# Patient Record
Sex: FEMALE | Race: WHITE
[De-identification: names, ages, dates, MRNs, and addresses within clinical notes are randomized per-mention and may not be internally consistent; named-entity substitution may affect disease eponyms.]

---

## 2019-03-24 ENCOUNTER — HOSPITAL ENCOUNTER (OUTPATIENT)
Dept: HOSPITAL 84 - D.ER | Age: 54
Setting detail: OBSERVATION
Discharge: HOME | End: 2019-03-24
Attending: FAMILY MEDICINE | Admitting: FAMILY MEDICINE
Payer: COMMERCIAL

## 2019-03-24 VITALS — DIASTOLIC BLOOD PRESSURE: 78 MMHG | SYSTOLIC BLOOD PRESSURE: 135 MMHG

## 2019-03-24 VITALS — WEIGHT: 147.51 LBS | HEIGHT: 65 IN | BODY MASS INDEX: 24.58 KG/M2 | BODY MASS INDEX: 24.58 KG/M2

## 2019-03-24 VITALS — SYSTOLIC BLOOD PRESSURE: 113 MMHG | DIASTOLIC BLOOD PRESSURE: 71 MMHG

## 2019-03-24 VITALS — SYSTOLIC BLOOD PRESSURE: 135 MMHG | DIASTOLIC BLOOD PRESSURE: 78 MMHG

## 2019-03-24 DIAGNOSIS — Z82.49: ICD-10-CM

## 2019-03-24 DIAGNOSIS — I24.9: Primary | ICD-10-CM

## 2019-03-24 LAB
ANION GAP SERPL CALC-SCNC: 15 MMOL/L (ref 8–16)
BASOPHILS NFR BLD AUTO: 0.1 % (ref 0–2)
BUN SERPL-MCNC: 11 MG/DL (ref 7–18)
CALCIUM SERPL-MCNC: 8.2 MG/DL (ref 8.5–10.1)
CHLORIDE SERPL-SCNC: 104 MMOL/L (ref 98–107)
CK MB SERPL-MCNC: 1.5 U/L (ref 0–3.6)
CK SERPL-CCNC: 86 UL (ref 21–215)
CO2 SERPL-SCNC: 22 MMOL/L (ref 21–32)
CREAT SERPL-MCNC: 0.7 MG/DL (ref 0.6–1.3)
EOSINOPHIL NFR BLD: 0.8 % (ref 0–7)
ERYTHROCYTE [DISTWIDTH] IN BLOOD BY AUTOMATED COUNT: 13 % (ref 11.5–14.5)
GLUCOSE SERPL-MCNC: 119 MG/DL (ref 74–106)
HCT VFR BLD CALC: 40.1 % (ref 36–48)
HGB BLD-MCNC: 13.5 G/DL (ref 12–16)
IMM GRANULOCYTES NFR BLD: 0.1 % (ref 0–5)
LYMPHOCYTES NFR BLD AUTO: 20.9 % (ref 15–50)
MCH RBC QN AUTO: 32.1 PG (ref 26–34)
MCHC RBC AUTO-ENTMCNC: 33.7 G/DL (ref 31–37)
MCV RBC: 95.5 FL (ref 80–100)
MONOCYTES NFR BLD: 8.9 % (ref 2–11)
NEUTROPHILS NFR BLD AUTO: 69.2 % (ref 40–80)
OSMOLALITY SERPL CALC.SUM OF ELEC: 273 MOSM/KG (ref 275–300)
PLATELET # BLD: 247 10X3/UL (ref 130–400)
PMV BLD AUTO: 10.2 FL (ref 7.4–10.4)
POTASSIUM SERPL-SCNC: 4 MMOL/L (ref 3.5–5.1)
RBC # BLD AUTO: 4.2 10X6/UL (ref 4–5.4)
SODIUM SERPL-SCNC: 137 MMOL/L (ref 136–145)
TROPONIN I SERPL-MCNC: 0.08 NG/ML (ref 0–0.06)
WBC # BLD AUTO: 11.8 10X3/UL (ref 4.8–10.8)

## 2019-03-24 NOTE — NUR
1500 20 GAUGE IV CATHETER REMOVED FROM LEFT FOREARM. NO BLEEDING FROM SITE.
2X2 GAUZE APPLIED TO SITE AND COVERED WITH BANDAID.
1515 DISCHARGE INSTRUCTIONS PROVIED TO PATIENT AND HER . VERBALIZED
UNDERSTANDING OF ALL INSTRUCTIONS PROVIDED.
1545 PATIENT LEFT UNIT WITH ALL PERSONAL BELONGINGS. PATIENT IN NO DISTRESS
UPON LEAVING UNIT VIA WHEELCHAIR.  PATIENT DISCHARGED TO HOME WITH  VIA
PERSONAL VEHICLE.

## 2019-03-24 NOTE — NUR
PATIENT RETURNED TO UNIT FROM CATH LAB WITH TR BAND TO RIGHT RADIAL. PULSES
PATENT. NO BLEEDING FROM TR BAND. ALERT/ORIENTED. CALL LIGHT WITHIN REACH. NO
DISTRESS.

## 2019-03-24 NOTE — HEMODYNAMI
PATIENT:STEVE MUNOZ                               MEDICAL RECORD: [id number]
: 61                                            LOCATION:[address]
ACCT# [id number]                                       ADMISSION DATE: 19
 
 
 Generatedon:3/24/402576:07
Patient name: STEVE MUNOZ Patient #: [id number] Visit #: [id number] SSN: :  Date of
study: 3/24/2019
Page: Of
Hemodynamic Procedure Report
****************************
Patient Data
Patient Demographics
Procedure consent was obtained
First Name: STEVE           Gender: Female
Last Name: ALEXANDER             : 1965
Middle Initial: OMAR     Age: 53 year(s)
Patient #: [id number]       Race: Unknown
Visit #: [id number]
Accession #:
[id number]
Additional ID: [id number]
Contact details
Address: [address] [place] [address]   Phone: [phone]
State: [facility]: [place]
Zip code: [address]
Past Medical History
Allergies: No known allergies
Admission
Admission Data
Admission Date: 3/24/2019   Admission Time: 6:42
Room #: 2108
Height (in.): 64.96         BSA: 1.73 (m2)
Height (cm.): 165           BMI: 24.24 (kg/m2)
Weight (lbs.): 145.51
Weight (kg.): 66
Lab Results
Lab Result Date: 3/24/2019  Lab Result Time: 0:00
Biochemistry
Name         Units    Result                Min      Max
BUN          mg/dl    11       --(-*--)--   7        18
Creatinine   mg/dl    0.7      --(*---)--   0.6      1.3
CBC
Name         Units    Result                Min      Max
Hemoglobin   g/dl     13.5     --(*---)--   13.5     17.5
Procedure
Procedure Types
Cath Procedure
Diagnostic Procedure
LHC
LH w/Coronaries
Procedure Description
Procedure Date
Procedure Date: 3/24/2019
Procedure Start Time: 10:58
Procedure End Time: 11:05
Procedure Staff
 
Name                            Function
Toby Ledesma MD              Performing Physician
Jayda Chavez RT               Monitor
Jeremy Mackey RN              Nurse
Philippe Lezama RT                  Scrub
Procedure Data
Cath Procedure
Fluoroscopy
Diagnostic fluoroscopy      Total fluoroscopy Time: 1.2
time: 1.2 min               min
Diagnostic fluoroscopy      Total fluoroscopy dose: 207
dose: 207 mGy               mGy
Contrast Material
Contrast Material Type                       Amount (ml)
Isovue 300                                   46
Entry Location
Entry     Primary  Successful  Side  Size  Upsize Upsize Entry    Closure     [place]
ccessful  Closure
Location                             (Fr)  1 (Fr) 2 (Fr) Remarks  Device        
          Remarks
Radial                         Right 6 Fr                         Mechanical
artery                               Short                        Compression
Estimated blood loss: 10 ml
Diagnostic catheters
Device Type               Used For           End Catheter
Placement
DIAGNOSTIC [place] 110cm 5  Procedure
Fr catheter (225964)
Procedure Complications
No complications
Procedure Medications
Medication           Administration Route Dosage
0.9% NaCl            I.V.                 100 ml/hr
Oxygen               etCO2 Nasal cannula  2 l/min
Heparin Flush Bag    added to field       2 bags
(1000units/500ml NS)
Lidocaine 2%         added to field       20
Radial Cocktail      added to field       1 syringe
(Verapomil 2mg/Nitro
400mcg/Heparin
1500units)
Versed               I.V.                 2 mg
Fentanyl             I.V.                 100 mcg
Radial Cocktail      I.A.                 1 syringe
(Verapomil 2mg/Nitro
400mcg/Heparin
1500units)
Hemodynamics
Rest
BSA: 1.73 (m2) HGB: 13.5 (g/dl) O2 Consumption: Estimated: 180.89 (ml/min) O2 Co
nsumption indexed:
Estimated:104.56 (ml/min/m) Heart Rate: 92 (bpm)
Pressure Samples
Time     Site     Value (mmHg) Purpose      Heart      Use
Rate(bpm)
11:00    LV       100/5,5      Snapshot     104
11:00    AO       94/76(84)    Pullback     106
11:00    LV       95/2,3       Pullback     106
Gradients
Valve  Time  Site 1 Site 2    Mean    SEP/DFP    Peak To Heart  Use
 
(mmHg)  (sec/min)  Peak    Rate
(mmHg)  (bpm)
Aortic 11:00 LV     AO        2       15         1       106
95/2,3 94/76(84)
Calculations
Valve    P-P      Mean      Valve     Index  Valve    Source
Name              Gradient  Area             Flow
(cm2)
Aortic   1        2
1        2
Snapshots
Pre Cath      Intra         NCS           Post Cath
Vital Signs
Time     Heart  Resp   SPO2 etCO2   NIBP (mmHg) Rhythm  Pain    Sedation
Rate   (ipm)  (%)  (mmHg)                      Status  Level
(bpm)
10:41:34 95     17     99   35.3    124/82(98)  NSR     0 (11)  10(A)
, No
pain
10:45:41 85     11     98   36.8    116/78(94)  NSR     0 (11)  10(A)
, No
pain
10:49:47 96     28     98   39.8    123/76(103) NSR     0 (11)  10(A)
, No
pain
10:53:55 93     12     97   38.3    112/75(92)  NSR     0 (11)  10(A)
, No
pain
10:57:57 94     12     97   37.6    118/79(90)  NSR     0 (11)  10(A)
, No
pain
11:02:02 105    12     94   36      109/60(86)  NSR     0 (11)  9(A)
, No
pain
11:06:08 99     11     95   37.6    106/66(94)  NSR     0 (11)  9(A)
, No
pain
Medications
Time     Medication       Route   Dose    Verified Delivered Reason       Notes 
 Effectiveness
by       by
10:41:53 0.9% NaCl        I.V.    100     Jeremy  Jeremy   Per
ml/hr   Narendra Mackey   physician
RN       RN
10:42:06 Oxygen           etCO2   2 l/min Jeremy  Jeremy   for low 02
Nasal           Lorigan  Narendra   sats
cannula         RN       RN
10:42:21 Heparin Flush    added   2 bags  Jeremy  Jeremy   used for
Bag              to              Lorigan  Lorloren   procedure
(1000units/500ml field           RN       RN
NS)
10:42:35 Lidocaine 2%     added   20ml    Jeremy  Jeremy   for local
to      vial    Lorigan  Lorigan   anesthetic
           RN       RN
10:42:46 Radial Cocktail  added   1       Jeremy  Jeremy   used for
(Verapomil       to      syringe Lorigan  Lorigan   procedure
2mg/Nitro                   RN       RN
400mcg/Heparin
1500units)
10:56:28 Versed           I.V.    2 mg    Jeremy  Jeremy   for sedation
 
Narendra Mackey
RN       RN
10:56:37 Fentanyl         I.V.    100 mcg Jeremy  Jeremy   for sedation
Narendra Mackey
RN       RN
10:59:12 Radial Cocktail  I.A.    1       Jeremy  Toby   for
(Verapomil               syringe Lorigan  Baltazar   vasodilation
2mg/Nitro                        RN       MD
400mcg/Heparin
1500units)
Procedure Log
Time     Note
10:14:20 Philippe Lezama RT(R) sent for patient. Start room use.
10:14:21 Time tracking: Call back (After hours or weekends)
10:14:26 Plan of Care:Hemodynamics will remain stable., Cardiac
rhythm will remain stable., Comfort level will be
maintained., Respiratory function will remain
adequate., Patient/ family verbilizes understanding of
procedure., Procedure tolerated without complication.,
Recovers from procedure without complications..
10:27:40 Patient received from Med II to CCL 1 Alert and
oriented. Tansferred to table in Supine position.
10:27:41 Warm blankets applied, and ford hugger turned on for
patient comfort.
10:27:43 Correct patient and procedure confirmed by team.
10:27:50 H&P Date Dictated: 3/23/2019 Within 30 days and on
chart., H&P Addendum completed by physician on day of
procedure. (MUST COMPLETE FOR ALL OUTPATIENTS).
10:27:53 Pre-procedure instructions explained to patient.
10:28:00 Family in patients room.
10:28:02 Patient NPO since Midnight.
10:28:13 Patient allergic to No known allergies
10:28:17 Is the patient allergic to Iodine/contrast media? No.
10:28:20 Was the patient premedicated? Yes
10:30:15 Patient Height : 64.96 inches
10:30:19 Patient Weight : 145.51 lbs
10:30:46 Lab Result : Hemoglobin 13.5 g/dl
10:30:46 Lab Result : Creatinine 0.7 mg/dl
10:30:46 Lab Result : BUN 11 mg/dl
10:40:29 Is patient on blood thinner?No
10:40:34 Signed procedure consent form obtained from patient.
10:40:36 ECG and BP/O2 sat monitors applied to patient.
10:40:37 Vital chart was started
10:40:42 Full Disclosure recording started
10:40:46 Patient diabetic? No.
10:40:51 Snore? Yes
10:40:53 Sleep apnea? No
10:41:06 IV patent on arrival in left forearm with 0.9% NaCl at
[facility].
10:41:15 Lab results completed and on chart.
10:41:19 Right Radial & Right Groin area was prepped with
chlora-prep and draped in sterile fashion
10:41:20 Alarms reviewed by R. N.
10:41:23 Sharps counted by scrub and verified by R.N.
10:41:24 Physician paged
10:41:39 Baseline sample Acquired.
10:41:53 0.9% NaCl 100 ml/hr I.V. was administered by Jeremy Mackey RN; Per physician;
10:42:06 Oxygen 2 l/min etCO2 Nasal cannula was administered by
Jeremy Mackey RN; for low 02 sats;
 
10:42:21 Heparin Flush Bag (1000units/500ml NS) 2 bags added to
field was administered by Jeremy Mackey RN; used for
procedure;
10:42:35 Lidocaine 2% 20ml vial added to field was administered
by Jeremy Mackey RN; for local anesthetic;
10:42:46 Radial Cocktail (Verapomil 2mg/Nitro 400mcg/Heparin
1500units) 1 syringe added to field was administered
by Jeremy Mackey RN; used for procedure;
10:54:47 Physician arrived
10:54:48 --------ALL STOP TIME OUT------
10:54:49 Final Timeout: patient, procedure, and site verified
with staff and physician. All members of the team are
in agreement.
10:54:51 Right Radial & Right Groin site verified by team.
10:54:58 Maximum allowable Isovue 300 dose 300ml. Physician
notified. (300ml for normal creatinines. For patients
with creatinine of 1.7 or higher multiply weight(kg) x
5 divided by creatinine.)
10:55:05 Fire Safety Assessment: A--An alcohol-based skin
anteseptic being used preoperatively., C--Open oxygen
or nitrous oxide is being used., D--An ESU, laser, or
fiber-optic light is being used.
10:55:09 Physical assessment completed. ASA score P 2 - A
patient with mild systemic disease as per Toby Ledesma MD.
10:55:13 Sedation plan: IV Moderate Sedation Medication:Versed,
Fentanyl
10:55:22 Use device set Radial Dx or PCI
10:55:25 ACIST Syringe (71140) opened to sterile field.
10:55:26 Medline Cath Pack ([id number]) opened to sterile field.
10:55:26 Bag Decanter () opened to sterile field.
10:55:27 DIAGNOSTIC WIRE .035 260cm J wire (156713) opened to
sterile field.
10:55:28 Tegaderm 4 x 4 (1626W) opened to sterile field.
10:55:29 MBrace Wrist Support ([id number]) opened to sterile
field.
10:55:34 SHEATH 6FR Slender () opened to sterile field.
10:55:41 Zero performed for pressure channel P1
10:56:28 Versed 2 mg I.V. was administered by Jeremy Mackey RN; for sedation;
10:56:37 Fentanyl 100 mcg I.V. was administered by Jeremy Mackey RN; for sedation;
10:58:29 Procedure started.
10:58:52 Local anesthetic to right radial artery with Lidocaine
2% by Toby Ledesma MD.**INITIAL ACCESS ONLY**
10:59:05 A 6 Fr Short sheath was inserted into the Right Radial
artery
10:59:12 Radial Cocktail (Verapomil 2mg/Nitro 400mcg/Heparin
1500units) 1 syringe I.A. was administered by Toby Ledesma MD; for vasodilation;
10:59:56 A DIAGNOSTIC Tiger 110cm 5 Fr catheter (322369) was
advanced over the wire and used for Procedure.
11:00:00 ACIST Hand Control (13310) opened to sterile field.
11:00:01 ACIST Manifold (86346) opened to sterile field.
11:00:08 j wire advanced.
11:00:23 LV angiography performed.
11:00:50 LCA angiography performed.
11:02:01 RCA angiography performed.
11:02:22 EF : 55 %
11:02:26 Catheter removed.
 
11:02:42 Sheath removed intact; hemostasis achieved with
Mechanical Compression to the Right Radial artery.
11:02:58 TR BAND Standard ([id number]) opened to sterile field.
11:03:01 Procedure ended.(Physican Out)
11:04:13 Fluoroscopy time 01.20 minutes.
11:04:18 Fluoroscopy dose: 207 mGy
11:04:18 Flurop Dose total: 207
11:04:23 Contrast amount:Isovue 300 46ml.
11:04:34 TR band inflated with 10cc of air.
11:04:40 Post Procedure Pulses reassessed and unchanged
11:04:45 Post-procedure physical assessment completed. ASA
score P 2 - A patient with mild systemic disease as
per Toby Ledesma MD.
11:04:51 Post procedure rhythm: sinus rhythm
11:04:54 Estimated blood loss: 10 ml
11:04:56 Post procedure instruction explained to
patient.Patient verbalizes understanding.
11:05:04 Procedure and supply charges have been captured,
reviewed, submitted and are correct.
11:05:22 Procedure Complication : No complications
11:05:25 Vital chart was stopped
11:05:26 See physician's report for complete and final results.
11:05:29 Report given to [facility].
11:05:34 Patient transfered to [facility] with Bed.
11:05:37 Procedure ended.
11:05:37 Full Disclosure recording stopped
11:05:44 End room use (Document Last)
Device Usage
Item Name   Manufacture  Quantity  Catalog      Hospital Part    Current Minimal
 Lot# /
Number       Charge   Number  Stock   Stock   Serial#
Code
ACIST       Acist        1         81576        441231   750467  019438  20
Syringe     Medical
(54585)     Systems Inc
Medline     Medline      1         [id number]    460723   87201   637813  5
Cath Pack
([id number])
Bag         Microtek     1         S        867664   25559   200035  5
Decanter    Medical Inc.
()
DIAGNOSTIC  [place]      1         377850       537637   617131  140470  30
WIRE .035
260cm J
wire
(262861)
Tegaderm 4  3M           1         1626W        501852   805926  429702  5
x 4 (1626W)
MBrace      Advanced     1         140-0250-00  054917   10646   857090  5
Wrist       Vascular
Support     Dynamics
([id number])
SHEATH 6FR  Terumo       1         [id number]   680119   974341  239584  5
Slender
()
DIAGNOSTIC  Terumo       1               151083   331442  342741  5
Tiger 110cm
5 Fr
catheter
(453320)
 
ACIST Hand  Acist        1         09437        379283   901681  331147  5
Control     Medical
(23019)     Systems Inc
ACIST       Acist        1         70934        099399   638902  105976  5
Manifold    Medical
(30093)     Systems Inc
TR BAND     Terumo       1         [id number]    018232   312153  069612  40
Standard
([id number])
Signature Audit [place]
Stage           Time        Signature      Unsigned
Intra-Procedure 3/24/2019   Jayda Chavez
11:07:12 AM RT(R)
Signatures
Monitor : Jayda Chavez Signature :
RT                       ______________________________
Date : ______________ Time :
______________
 
 
 
 
 
 
 
 
 
 
 
 
 
 
 
 
 
 
 
 
 
 
 
 
 
 
 
 
 
 
 
 
 
 
 
 
 
[place]                           
[place] [address]

## 2019-03-24 NOTE — NUR
1415 TR BAND REMOVED FROM RIGHT WRIST. NO BLEEDING. 2X2 GAUZE APPLIED AND
SECURED WITH TEGADERM CLEAR DRESSING. NO DISTRESS.

## 2019-03-24 NOTE — NUR
RECEIVED PATIENT VIA WHEELCHAIR FROM THE ED. RECEIVED REPORT FROM DINESH.
PATIENT ALERT/ORIENTED/AMBUALTORY. DENIES PAIN AT THIS TIME. PATIENT SPOUSE AT
BEDSIDE WITH HER. NO DISTRESS.

## 2019-03-24 NOTE — NUR
ATTEMPTED TO RELEASE AIR FROM TR BAND, PATIENT STARTED BLEEDING FROM SITE, AIR
REPLACED, NO BLEEDING, WILL ATTEMPT IN 15 MINUTES PER PROTOCOL.

## 2019-03-25 NOTE — CN
PATIENT NAME:STEVE MUNOZ                            MEDICAL RECORD: [id number]
: 61                                              LOCATION:D. D.2108
ADMIT DATE: 19                                       ACCOUNT: [id number]
CONSULTING PHYSICIAN:    LAURA CAI MD          
                                               
REFERRING PHYSICIAN:     MADHURI REED MD                
 
 
DATE OF CONSULTATION:  2019
 
HISTORY OF PRESENT ILLNESS:  A 53-year-old female with no known history of
coronary artery disease, has a strong family history of coronary artery disease,
yesterday was hiking, had onset of chest pressure and tightness with exertion,
unrelieved with rest, tried antacids, eventually presented to the ER, found to
have elevated troponin and transferred here from [place] for further evaluation.
 
PAST MEDICAL HISTORY:  Includes history of valve replacement.
 
ALLERGIES:  None known.
 
MEDICATIONS:  Include Synthroid 88 mcg every day.
 
SOCIAL HISTORY:  , lives in [place], does exercise.  She is a nonsmoker.
 
REVIEW OF SYSTEMS:  The patient reports easy bruising but reports no swollen
glands. The patient reports no fever, no night sweats, no significant weight
gain, no significant weight loss.  No significant exercise tolerance.  The
patient reports no dry eyes, no irritation, no vision change.  Patient reports
no difficulty hearing and no ear pain.  Patient reports no frequent nose bleeds
or nose and sinus problems.  Patient reports on arm pain on exertion.   No
shortness of breath while lying down.  No history of heart murmur.  Patient
reports no cough, no wheezing or coughing up blood.  Patient reports no
abdominal pain, no vomiting.  Normal appetite.  No diarrhea and not vomiting
blood.  No nausea and no constipation.  Patient reports no incontinence.  No
difficulty urinating.  No hematuria.  No increased frequency.  Patient reports
no muscle aches.  No weakness, no arthralgias, no back pain.  No swelling of the
extremities.  Patient reports no abnormal mole, no jaundice, no rashes.  Reports
no loss of consciousness.  No weakness and no numbness.  No seizures, dizziness,
or headaches.  The patient reports no depression, no sleep disturbance, feeling
safe in a relationship and no alcohol abuse.  Patient reports on fatigue. 
Reports no runny nose or sinus pressure.  No itching, no hives, and no frequent
sneezing.  
 
PHYSICAL EXAMINATION:
GENERAL:  Pleasant female in no acute distress, appears stated age.
VITAL SIGNS:  Blood pressure 125/77, pulse 94 and regular.
HEENT:  Normocephalic, atraumatic.
NECK:  No JVD or bruit.
HEART:  Regular, questionable S4 gallop.
LUNGS:  Good air excursion.
ABDOMEN:  Soft, nontender.
EXTREMITIES:  Pulses 2+.  There is no edema.
 
DIAGNOSTIC DATA:  ECG has minor nonspecific ST-T changes, certainly not
diagnostic.
 
IMPRESSION:  Acute coronary syndrome.  Angiography, intervention based on above.
 
 
 
 
CONSULT REPORT                                 [id number]    ALEXANDERSTEVE OMAR          
 
 
TRANSINT:[id number] Voice Confirmation ID: [id number] DOCUMENT ID: [id number]
                                           
                                           LAURA CAI MD          
 
 
 
Electronically Signed by LAURA CAI on 19 at 0213
 
 
 
 
 
 
 
 
 
 
 
 
 
 
 
 
 
 
 
 
 
 
 
 
 
 
 
 
 
 
 
 
 
 
 
 
 
 
 
 
CC:                                                             [id number]
DICTATION DATE: 19     :     19 1133      DIS IN  
                                                                      19
[address] [place] [address]

## 2019-03-25 NOTE — OP
PATIENT NAME:  STEVE MUNOZ                        MEDICAL RECORD: [id number]
:61                                             LOCATION:D.M2     D.2108
                                                         ADMISSION DATE:19
SURGEON:  LAURA CIA MD          
 
 
DATE OF OPERATION:  2019
 
PROCEDURE:  Left heart catheterization, selective coronary angiography, right
radial approach.
 
CATHETERS:  Radial sheath, Tiger catheter.
 
The procedure was well tolerated.  The patient was returned to [place], sheath
removed.  TR band was placed.
 
FINDINGS:  Left ventriculography in 30-degree NANCE view:  Normal wall motion. 
Normal systolic function.
 
CORONARY ANATOMY:
LEFT MAIN:  Left main is free of disease.
LAD:  Free of disease in the diagonal system.
CIRCUMFLEX:  Free of disease in the marginal system.
RIGHT CORONARY ARTERY:  Somewhat codominant system, free of disease.
 
IMPRESSION:  Normal coronary anatomy, normal left ventricular function.
 
TRANSINT:[id number] Voice Confirmation ID: [id number] DOCUMENT ID: [id number]
                                           
                                           LAURA CAI MD          
 
 
 
Electronically Signed by LAURA CAI on 19 at 0213
 
 
 
 
 
 
 
 
 
 
 
 
 
 
 
 
CC:                                                             [id number]
DICTATION DATE: 19 1109     :     19 1150      DIS IN  
                                                                      19
[facility]                                          
[address] [id number]

## 2019-03-25 NOTE — MORECARE
CASE MANAGEMENT DISCHARGE SUMMARY
 
 
PATIENT: STEVE MUNOZ                  UNIT: [id number]
ACCOUNT#: [id number]                       ADM DATE: 19
AGE: 53     : [birth year]  SEX: F            ROOM/BED: D.2108    
AUTHOR: BRANDAN AGUILAR                             PHYSICIAN:                               
 
REFERRING PHYSICIAN: MADHURI REED MD                
DATE OF SERVICE: 19
Discharge Plan
 
 
Patient Name: STEVE MUNOZ
[facility]
Encounter #: [id number]
Medical Record #: [id number]
: 1965
Planned Disposition: Home
Anticipated Discharge Date: 3/24/19
 
Discharge Date: 2019
Expected LOS: 1
Initial Reviewer: [id number]
Initial Review Date: 2019
Generated: 3/25/19   9:14 am 
  
 
 
 
 
 
 
 
Patient Name: STEVE MUNOZ
 
Encounter #: [id number]
Page [id number]
 
 
 
 
 
Electronically Signed by BRANDAN AGUILAR on 19 at 0814
 
 
 
 
 
 
**All edits/amendments must be made on the electronic document**
 
DICTATION DATE: 19     : JOSEPH  19     
RPT#: [id number]                                DC DATE:19
                                               STATUS: DIS IN  
[facility]
[address] [place] [address]
***END OF REPORT***

## 2024-10-30 NOTE — NUR
5ML OF AIR ABLE TO BE REMOVED FROM TR BAND AT THIS TIME. NO DISTRESS. What Type Of Note Output Would You Prefer (Optional)?: Standard Output What Is The Reason For Today's Visit?: Full Body Skin Examination What Is The Reason For Today's Visit? (Being Monitored For X): concerning skin lesions on an annual basis